# Patient Record
Sex: FEMALE
[De-identification: names, ages, dates, MRNs, and addresses within clinical notes are randomized per-mention and may not be internally consistent; named-entity substitution may affect disease eponyms.]

---

## 2021-02-03 ENCOUNTER — APPOINTMENT (OUTPATIENT)
Dept: AFTER HOURS CARE | Facility: EMERGENCY ROOM | Age: 31
End: 2021-02-03
Payer: SELF-PAY

## 2021-02-03 DIAGNOSIS — R30.0 DYSURIA: ICD-10-CM

## 2021-02-04 PROBLEM — Z00.00 ENCOUNTER FOR PREVENTIVE HEALTH EXAMINATION: Status: ACTIVE | Noted: 2021-02-04

## 2021-02-04 PROCEDURE — 99204 OFFICE O/P NEW MOD 45 MIN: CPT | Mod: 95

## 2021-06-17 PROBLEM — R30.0 DYSURIA: Status: ACTIVE | Noted: 2021-06-17

## 2021-06-17 NOTE — HISTORY OF PRESENT ILLNESS
[Home] : at home, [unfilled] , at the time of the visit. [Other Location: e.g. Home (Enter Location, City,State)___] : at [unfilled] [Partner] : partner [Verbal consent obtained from patient] : the patient, [unfilled] [FreeTextEntry8] : 29 y/o F with no relevant PMHx presents to ED for dysuria, urinary frequency, urgency, and suprapubic discomfort x 1 day.  No fever or chills.  No N/V.  No flank pain.  Hx of same in the past, 2 months ago, responsive to Bactrim.  LMP 1 week ago.

## 2021-06-17 NOTE — PLAN
[FreeTextEntry1] : No indications of sepsis, no signs of pyelonephritis.  Will d/c with Pyridium and Cefpodoxime.  Strict follow up encouraged.

## 2021-06-17 NOTE — PHYSICAL EXAM
[No Acute Distress] : no acute distress [Normal Sclera/Conjunctiva] : normal sclera/conjunctiva [Normal Oropharynx] : the oropharynx was normal [No JVD] : no jugular venous distention [No Respiratory Distress] : no respiratory distress  [No CVA Tenderness] : no CVA  tenderness [No Rash] : no rash [de-identified] : Guided self exam